# Patient Record
Sex: FEMALE | Race: WHITE | NOT HISPANIC OR LATINO | URBAN - NONMETROPOLITAN AREA
[De-identification: names, ages, dates, MRNs, and addresses within clinical notes are randomized per-mention and may not be internally consistent; named-entity substitution may affect disease eponyms.]

---

## 2018-08-06 PROBLEM — H40.023: Noted: 2018-03-29

## 2018-08-06 PROBLEM — H26.491: Noted: 2019-03-25

## 2018-08-06 PROBLEM — H43.811: Noted: 2018-03-29

## 2018-08-06 PROBLEM — H35.373: Noted: 2019-03-25

## 2018-08-06 PROBLEM — H40.023: Noted: 2019-03-25

## 2018-08-06 PROBLEM — H35.373: Noted: 2018-08-06

## 2018-08-06 PROBLEM — Z96.1: Noted: 2018-08-06

## 2018-08-06 PROBLEM — H26.493: Noted: 2018-08-06

## 2018-08-06 PROBLEM — H04.123: Noted: 2018-08-06

## 2019-03-15 ENCOUNTER — IMPORTED ENCOUNTER (OUTPATIENT)
Dept: URBAN - NONMETROPOLITAN AREA CLINIC 1 | Facility: CLINIC | Age: 79
End: 2019-03-15

## 2019-03-15 PROCEDURE — 92250 FUNDUS PHOTOGRAPHY W/I&R: CPT

## 2019-03-15 PROCEDURE — 92014 COMPRE OPH EXAM EST PT 1/>: CPT

## 2019-03-15 NOTE — PATIENT DISCUSSION
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 -  Optos done today stable findings OU. -  IOP stable at 14 OU. [facility] done previously:   .-  VF done previously reliable OU with scattered defects. -  OCT done previously good RNFL OU with no unusual findings. -  CDs noted at 0.6 OD and 0.65 OS. -  Continue to mointor every 3-4 months now that she is off the medication. EDUARDO OU mild-  Discussed findings in detail w/ pt today-  Signs/symptoms associated discussed. -  Continue ATs OU BID PRN. -  Continue to monitor PRN. 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 -  Optos done today stable findings from previous. -  Advised to use AG daily call or come in ASAP if changes are noted in vision from today - Continue to monitor Hx of old retinal tear OD: - Educated patient on condition - Appears stable to today - Signs/symptoms of RD discussed call or come in ASAP if changes are noted in vision to be different from today - Continue to monitor PVD OD: - Discussed findings of exam in detail with the patient. - The risk of retinal detachment in patients with PVDs was discussed with the patient and the warning signs of retinal detachment were carefully reviewed with the patient. - The patient was warned to return to the office or contact the ophthalmologist on call immediately if they experience signs of retinal detachment or changes in vision different from today. - Continue to monitor Dermatochalasis OU - Discussed diagnosis in detail w/ pt today - No superior field of vision complaint noted at this time - Continue to monitorP/C IOL OU-  Discussed findings w/ pt today-  Stable doing well-  s/p Yag PC OSopen capsule noted-  PCO noted OD now visually significant. Recommend evaluation by Dr. Vaibhav Arriaza pt agreed with plan. -  Monitor yearly or PRN; 's Notes: MR 7/19/16 ([facility] OU)DFE  3/15/19OCT disc 7/19/16OCT mac  8/6/18Optos  3/15/19VF  8/6/18Gonio 1/24/2017PACH  9/26/17 - Jacob (Dong 2012 or before? )Level 3 - 3/29/18

## 2019-03-20 ENCOUNTER — IMPORTED ENCOUNTER (OUTPATIENT)
Dept: URBAN - NONMETROPOLITAN AREA CLINIC 1 | Facility: CLINIC | Age: 79
End: 2019-03-20

## 2019-03-20 PROCEDURE — 66821 AFTER CATARACT LASER SURGERY: CPT

## 2019-03-20 PROCEDURE — 92014 COMPRE OPH EXAM EST PT 1/>: CPT

## 2019-03-20 NOTE — PATIENT DISCUSSION
P/C IOL OU-  Discussed findings w/ pt today-  s/p Yag PC OSopen capsule noted- Explained PCO and RBAs of YAG Capsulotomy to pt. - Pt elects to proceed. YAG Caps OD today and POV in 1-2 weeks.; 's Notes: MR 7/19/16 ([facility] OU)DFE  3/15/19OCT disc 7/19/16OCT mac  8/6/18Optos  3/15/19VF  8/6/18Gonio 1/24/2017PACH  9/26/17 - Jacob (Dong 2012 or before? )Level 3 - 3/29/18

## 2019-04-03 ENCOUNTER — IMPORTED ENCOUNTER (OUTPATIENT)
Dept: URBAN - NONMETROPOLITAN AREA CLINIC 1 | Facility: CLINIC | Age: 79
End: 2019-04-03

## 2019-04-03 PROBLEM — H35.373: Noted: 2019-04-03

## 2019-04-03 PROBLEM — H43.811: Noted: 2018-03-29

## 2019-04-03 PROBLEM — Z96.1: Noted: 2019-04-03

## 2019-04-03 PROBLEM — Z98.890: Noted: 2019-10-03

## 2019-04-03 PROBLEM — H40.023: Noted: 2019-03-25

## 2019-04-03 PROBLEM — H16.223: Noted: 2019-04-03

## 2019-04-03 PROBLEM — Z98.890: Noted: 2019-04-03

## 2019-04-03 PROBLEM — H40.023: Noted: 2019-10-03

## 2019-04-03 PROCEDURE — 99024 POSTOP FOLLOW-UP VISIT: CPT

## 2019-04-03 NOTE — PATIENT DISCUSSION
Pseudophakia OU - Discussed diagnosis in detail with patient - S/P YAG PC OD 3/20/19 - Good central opening - S/P YAG PC OS - Good central opening - Patient is stable and doing well - Continue to [facility] A/S Glaucoma Suspect OU:  - Discussed diagnosis in detail with patient - No family hx that patient is aware of. - PACH done previously   .- VF repeated previously findings not consistent with glaucoma diagnosis. - OCT done previously good RNFL OU with no unusual findings. - IOP stable  (now off the Latanoprost). - Cup to Disc noted at OD 0.6 and OS 0.65 - Continue to mointor every 3-4 months  EDUARDO OU mild- Discussed diagnosis in detail with patient- Discussed signs and symptoms of progression- Recommend patient drinking plenty of water and starting Omega 3’s - Recommend Refresh or Systane  throughout the day- Continue to monitorERM OU (OD>OS)- Discussed diagnosis in detail with patient - Appears stable on dilated exam. - Advised to use Amsler Grid daily call or come in ASAP if changes are noted in vision from today - Continue to monitor Hx of old retinal tear OD: - Discussed diagnosis in detail with patient  - Appears stable to today - Signs/symptoms of RD discussed call or come in ASAP if changes are noted in vision to be different from today - Continue to monitor PVD OD: - Discussed findings of exam in detail with the patient. - The risk of retinal detachment in patients with PVDs was discussed with the patient and the warning signs of retinal detachment were carefully reviewed with the patient. - The patient was warned to return to the office or contact the ophthalmologist on call immediately if they experience signs of retinal detachment or changes in vision different from today. - Continue to monitor Dermatochalasis OU - Discussed diagnosis in detail w/ pt today - No superior field of vision complaint noted at this time - Continue to monitor; 's Notes: MR 7/19/16 (LRI OU)DFE  3/15/19OCT disc 7/19/16OCT mac  8/6/18Optos  3/15/19VF  8/6/18Gonio 1/24/2017PACH  9/26/17 - Jacob (Dong 2012 or before? )Level 3 - 3/29/18

## 2019-06-19 NOTE — PATIENT DISCUSSION
New Prescription: erythromycin (erythromycin): ointment: 5 mg/gram (0.5 %) a small amount at bedtime into affected eye 06-

## 2019-06-24 NOTE — PATIENT DISCUSSION
Corneal Abrasion Counseling: I have explained to the patient that most minor corneal abrasions fully recover without permanent eye damage. I have further explained that deeper scratches can cause corneal infections, erosion, chronic loose epithelium or scarring if not treated properly. These complications can result in long-term vision problems. I have explained that proper healing is dependent on patient compliance of the treatment prescribed and to follow up as scheduled. It was explained that a bandage contact lens may increase the possibility of an infection and the alternative treatments including patching or lubrication alone were provided to the patient. The patient has elected to proceed with the bandage contact lens for comfort and convenience. The patient was instructed to keep water and any material including makeup if applicable out of the affected eye. The patient has been instructed to report any unusual symptoms during or following healing to include a recurrence of pain.

## 2019-06-24 NOTE — PATIENT DISCUSSION
Corneal Foreign Body: Discussed with patient there may some discomfort including pain, redness, light sensitivity, tearing and blurry vision. The foreign body was removed today in office. The patient was instructed not to rub the eye. Gave RX for Ocuflox 1 drop 4 x times a day. Artificial tears may be used in between doses of Ocuflox to relieve discomfort.

## 2019-10-03 ENCOUNTER — IMPORTED ENCOUNTER (OUTPATIENT)
Dept: URBAN - NONMETROPOLITAN AREA CLINIC 1 | Facility: CLINIC | Age: 79
End: 2019-10-03

## 2019-10-03 PROCEDURE — 92014 COMPRE OPH EXAM EST PT 1/>: CPT

## 2019-10-03 PROCEDURE — 92083 EXTENDED VISUAL FIELD XM: CPT

## 2019-10-03 PROCEDURE — 92133 CPTRZD OPH DX IMG PST SGM ON: CPT

## 2019-10-03 NOTE — PATIENT DISCUSSION
Glaucoma Suspect OU: - Discussed diagnosis in detail with patientNo family hx that patient is aware of. PACH done previously  . -- VF repeated today stable. OD normal and OS borderline nasal defects . - OCT done today good RNFL OU with no unusual findings. - IOP stable (now off the Latanoprost). -  -Cup to Disc noted at OD 0.6 and OS 0.65 - Continue to mointor Pseudophakia OU - Discussed diagnosis in detail with patient - S/P YAG PC OD 3/20/19 - Good central opening - S/P YAG PC OS - Good central opening - Patient is stable and doing well - Continue to montior - RTC A/S EDUARDO OU mild- Discussed diagnosis in detail with patient- Discussed signs and symptoms of progression- Recommend patient drinking plenty of water and starting Omega 3’s - Recommend Refresh or Systane  throughout the day- Continue to monitorERM OU (OD>OS)- Discussed diagnosis in detail with patient - Appears stable on dilated exam. - Advised to use Amsler Grid daily call or come in ASAP if changes are noted in vision from today - Continue to monitor Hx of old retinal tear OD: - Discussed diagnosis in detail with patient  - Appears stable to today - Signs/symptoms of RD discussed call or come in ASAP if changes are noted in vision to be different from today - Continue to monitor PVD OD: - Discussed findings of exam in detail with the patient. - The risk of retinal detachment in patients with PVDs was discussed with the patient and the warning signs of retinal detachment were carefully reviewed with the patient. - The patient was warned to return to the office or contact the ophthalmologist on call immediately if they experience signs of retinal detachment or changes in vision different from today. - Continue to monitor Dermatochalasis OU - Discussed diagnosis in detail w/ pt today - No superior field of vision complaint noted at this time - Continue to monitor; 's Notes: MR 7/19/16 (LRI OU)DFE  3/15/19OCT disc 10/3/19 OCT mac  8/6/18Optos  3/15/19VF  10/3/19Gonio 1/24/2017PACH  9/26/17 - Jacob (Dong 2012 or before? )Level 3 - 3/29/18

## 2021-08-10 ENCOUNTER — IMPORTED ENCOUNTER (OUTPATIENT)
Dept: URBAN - NONMETROPOLITAN AREA CLINIC 1 | Facility: CLINIC | Age: 81
End: 2021-08-10

## 2021-08-10 PROBLEM — Z96.1: Noted: 2021-08-10

## 2021-08-10 PROBLEM — H40.023: Noted: 2021-08-10

## 2021-08-10 PROBLEM — H35.373: Noted: 2021-08-10

## 2021-08-10 PROBLEM — H52.4: Noted: 2021-08-10

## 2021-08-10 PROBLEM — H43.811: Noted: 2021-08-10

## 2021-08-10 PROBLEM — H16.223: Noted: 2021-08-10

## 2021-08-10 PROCEDURE — 92015 DETERMINE REFRACTIVE STATE: CPT

## 2021-08-10 PROCEDURE — 92014 COMPRE OPH EXAM EST PT 1/>: CPT

## 2021-08-10 NOTE — PATIENT DISCUSSION
Presbyopia OU - Discussed diagnosis in detail with patient - New glasses RX given today - Continue to monitor  Glaucoma Suspect OU: --  Discussed diagnosis in detail with patient- No family hx that patient is aware of.- PACH done previously  . -- VF repeated previously stable. OD normal and OS borderline nasal defects . - OCT done previously good RNFL OU with no unusual findings. - IOP stable (now off the Latanoprost). -  -Cup to Disc noted at OD . 45 and OS . 5- Continue to mointor Pseudophakia OU - Discussed diagnosis in detail with patient - S/P YAG PC OU- Good central opening - Continue to montior EDUARDO OU mild- Discussed diagnosis in detail with patient- Discussed signs and symptoms of progression- Recommend patient drinking plenty of water and starting Omega 3’s - Recommend Refresh or Systane  throughout the day- Samples of Refresh PF given today - Continue to monitorERM OU (OD>OS)- Discussed diagnosis in detail with patient - Appears stable on dilated exam. - Advised to use Amsler Grid daily call or come in ASAP if changes are noted in vision from today - Continue to monitor Hx of old retinal tear OD: - Discussed diagnosis in detail with patient  - Signs/symptoms of RD discussed call or come in ASAP if changes are noted in vision to be different from today - Continue to monitor PVD OD: - Discussed findings of exam in detail with the patient. - The risk of retinal detachment in patients with PVDs was discussed with the patient and the warning signs of retinal detachment were carefully reviewed with the patient. - The patient was warned to return to the office or contact the ophthalmologist on call immediately if they experience signs of retinal detachment or changes in vision different from today. - Continue to monitor Dermatochalasis OU - Discussed diagnosis in detail w/ pt today - No superior field of vision complaint noted at this time - Continue to monitor; 's Notes: MR 7/19/16 (LRI OU)DFE  3/15/19OCT disc 10/3/19 OCT mac  8/6/18Optos  3/15/19VF  10/3/19Gonio 1/24/2017PACH  9/26/17 - Jacob (Dong 2012 or before? )Level 3 - 3/29/18

## 2022-03-23 NOTE — PATIENT DISCUSSION
Approaching visual significance. However, pt indicates not consistently interfering with activities of daily living. Updated glasses rx. Monitor x 1 year.

## 2022-04-09 ASSESSMENT — VISUAL ACUITY
OS_CC: 20/20
OD_GLARE: 20/40-
OD_CC: J1+
OS_CC: 20/20
OD_CC: 20/25-2
OD_CC: 20/20-
OS_CC: J1+
OS_CC: 20/20
OD_CC: 20/25-2
OD_GLARE: 20/40-
OS_CC: 20/20-2
OD_CC: 20/20
OD_CC: 20/25
OS_CC: 20/20

## 2022-04-09 ASSESSMENT — PACHYMETRY
OS_CT_UM: 543; ADJ: THIN
OS_CT_UM: 543; ADJ: THIN
OD_CT_UM: 544; ADJ: THIN
OD_CT_UM: 544; ADJ: THIN
OS_CT_UM: 543; ADJ: THIN
OS_CT_UM: 543; ADJ: THIN
OD_CT_UM: 544; ADJ: THIN
OS_CT_UM: 543; ADJ: THIN
OD_CT_UM: 544; ADJ: THIN
OD_CT_UM: 544; ADJ: THIN

## 2022-04-09 ASSESSMENT — TONOMETRY
OS_IOP_MMHG: 14
OS_IOP_MMHG: 14
OS_IOP_MMHG: 16
OD_IOP_MMHG: 13
OD_IOP_MMHG: 16
OD_IOP_MMHG: 15
OD_IOP_MMHG: 14
OD_IOP_MMHG: 16
OS_IOP_MMHG: 17
OS_IOP_MMHG: 14

## 2022-11-29 ENCOUNTER — ESTABLISHED PATIENT (OUTPATIENT)
Dept: URBAN - NONMETROPOLITAN AREA CLINIC 1 | Facility: CLINIC | Age: 82
End: 2022-11-29

## 2022-11-29 DIAGNOSIS — H52.4: ICD-10-CM

## 2022-11-29 PROCEDURE — 92015 DETERMINE REFRACTIVE STATE: CPT

## 2022-11-29 PROCEDURE — 92014 COMPRE OPH EXAM EST PT 1/>: CPT

## 2022-11-29 ASSESSMENT — TONOMETRY
OD_IOP_MMHG: 16
OS_IOP_MMHG: 17

## 2022-11-29 ASSESSMENT — VISUAL ACUITY
OS_PH: 20/30
OS_CC: 20/50
OD_CC: 20/30

## 2022-11-29 NOTE — PATIENT DISCUSSION
Discussed diagnosis in detail with patient. Appears stable on dilated exam. Advised to use Amsler Grid daily call or come in ASAP if changes are noted in vision from today. Continue to monitor.

## 2023-07-18 ENCOUNTER — FOLLOW UP (OUTPATIENT)
Dept: URBAN - NONMETROPOLITAN AREA CLINIC 1 | Facility: CLINIC | Age: 83
End: 2023-07-18

## 2023-07-18 DIAGNOSIS — H40.023: ICD-10-CM

## 2023-07-18 DIAGNOSIS — H16.223: ICD-10-CM

## 2023-07-18 DIAGNOSIS — H35.373: ICD-10-CM

## 2023-07-18 PROCEDURE — 92083 EXTENDED VISUAL FIELD XM: CPT

## 2023-07-18 PROCEDURE — 92133 CPTRZD OPH DX IMG PST SGM ON: CPT

## 2023-07-18 PROCEDURE — 99214 OFFICE O/P EST MOD 30 MIN: CPT

## 2023-07-18 ASSESSMENT — TONOMETRY
OD_IOP_MMHG: 16
OS_IOP_MMHG: 16

## 2023-07-18 ASSESSMENT — VISUAL ACUITY
OD_CC: 20/30
OS_CC: 20/20

## 2024-01-30 ENCOUNTER — FOLLOW UP (OUTPATIENT)
Dept: URBAN - NONMETROPOLITAN AREA CLINIC 1 | Facility: CLINIC | Age: 84
End: 2024-01-30

## 2024-01-30 DIAGNOSIS — H35.373: ICD-10-CM

## 2024-01-30 DIAGNOSIS — H40.023: ICD-10-CM

## 2024-01-30 DIAGNOSIS — H52.4: ICD-10-CM

## 2024-01-30 DIAGNOSIS — H16.223: ICD-10-CM

## 2024-01-30 PROCEDURE — 99213 OFFICE O/P EST LOW 20 MIN: CPT

## 2024-01-30 PROCEDURE — 92015 DETERMINE REFRACTIVE STATE: CPT

## 2024-01-30 ASSESSMENT — VISUAL ACUITY
OS_CC: 20/20
OD_CC: 20/25

## 2024-01-30 ASSESSMENT — TONOMETRY
OD_IOP_MMHG: 16
OS_IOP_MMHG: 17

## 2024-08-16 ENCOUNTER — FOLLOW UP (OUTPATIENT)
Dept: URBAN - NONMETROPOLITAN AREA CLINIC 1 | Facility: CLINIC | Age: 84
End: 2024-08-16

## 2024-08-16 DIAGNOSIS — H35.373: ICD-10-CM

## 2024-08-16 DIAGNOSIS — H40.023: ICD-10-CM

## 2024-08-16 PROCEDURE — 92133 CPTRZD OPH DX IMG PST SGM ON: CPT

## 2024-08-16 PROCEDURE — 92134 CPTRZ OPH DX IMG PST SGM RTA: CPT | Mod: NC

## 2024-08-16 PROCEDURE — 92083 EXTENDED VISUAL FIELD XM: CPT

## 2024-08-16 PROCEDURE — 99213 OFFICE O/P EST LOW 20 MIN: CPT

## 2024-08-16 ASSESSMENT — VISUAL ACUITY
OU_SC: 20/20-1
OS_SC: 20/20
OD_SC: 20/29

## 2024-08-16 ASSESSMENT — TONOMETRY
OS_IOP_MMHG: 14
OD_IOP_MMHG: 14

## 2025-03-17 ENCOUNTER — FOLLOW UP (OUTPATIENT)
Age: 85
End: 2025-03-17

## 2025-03-17 DIAGNOSIS — H52.4: ICD-10-CM

## 2025-03-17 PROCEDURE — 92014 COMPRE OPH EXAM EST PT 1/>: CPT

## 2025-03-17 PROCEDURE — 92015 DETERMINE REFRACTIVE STATE: CPT
